# Patient Record
Sex: FEMALE | Race: WHITE | NOT HISPANIC OR LATINO | Employment: FULL TIME | URBAN - METROPOLITAN AREA
[De-identification: names, ages, dates, MRNs, and addresses within clinical notes are randomized per-mention and may not be internally consistent; named-entity substitution may affect disease eponyms.]

---

## 2023-04-05 DIAGNOSIS — I10 ESSENTIAL (PRIMARY) HYPERTENSION: Primary | ICD-10-CM

## 2023-04-07 RX ORDER — B-COMPLEX WITH VITAMIN C
1 TABLET ORAL DAILY
COMMUNITY
End: 2023-05-25

## 2023-04-07 RX ORDER — AMLODIPINE BESYLATE 10 MG/1
10 TABLET ORAL DAILY
COMMUNITY
End: 2023-10-31

## 2023-04-07 RX ORDER — BUPROPION HYDROCHLORIDE 300 MG/1
1 TABLET ORAL DAILY
COMMUNITY
Start: 2023-03-06 | End: 2023-09-05

## 2023-04-07 RX ORDER — BENAZEPRIL HYDROCHLORIDE 40 MG/1
40 TABLET ORAL DAILY
COMMUNITY
End: 2023-09-08

## 2023-04-07 RX ORDER — CHOLECALCIFEROL (VITAMIN D3) 50 MCG
1 TABLET ORAL DAILY
COMMUNITY
Start: 2018-11-01

## 2023-04-07 RX ORDER — ATORVASTATIN CALCIUM 10 MG/1
10 TABLET, FILM COATED ORAL DAILY
COMMUNITY
End: 2023-09-25

## 2023-04-07 RX ORDER — IBUPROFEN 800 MG/1
800 TABLET ORAL 3 TIMES DAILY PRN
COMMUNITY
End: 2023-04-20 | Stop reason: HOSPADM

## 2023-04-07 RX ORDER — ASPIRIN 81 MG/1
81 TABLET ORAL DAILY
COMMUNITY

## 2023-04-07 RX ORDER — METOPROLOL SUCCINATE 200 MG/1
200 TABLET, EXTENDED RELEASE ORAL DAILY
COMMUNITY
End: 2023-04-20 | Stop reason: HOSPADM

## 2023-04-10 RX ORDER — METOPROLOL SUCCINATE 200 MG/1
TABLET, EXTENDED RELEASE ORAL
Qty: 90 TABLET | Refills: 1 | Status: SHIPPED | OUTPATIENT
Start: 2023-04-10 | End: 2023-10-31

## 2023-04-26 PROBLEM — B00.1 COLD SORE: Status: ACTIVE | Noted: 2023-04-26

## 2023-04-26 PROBLEM — E04.2 MULTIPLE THYROID NODULES: Status: ACTIVE | Noted: 2023-04-26

## 2023-04-26 PROBLEM — L98.9 SKIN LESION: Status: ACTIVE | Noted: 2023-04-26

## 2023-04-26 PROBLEM — H80.90 OTOSCLEROSIS: Status: ACTIVE | Noted: 2023-04-26

## 2023-04-26 PROBLEM — L40.9 PSORIASIS: Status: ACTIVE | Noted: 2023-04-26

## 2023-04-26 PROBLEM — B07.9 CUTANEOUS WART: Status: ACTIVE | Noted: 2023-04-26
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
## 2023-04-26 PROBLEM — E78.5 HYPERLIPIDEMIA: Status: ACTIVE | Noted: 2023-04-26

## 2023-04-26 PROBLEM — R53.83 FATIGUE: Status: ACTIVE | Noted: 2023-04-26

## 2023-04-26 PROBLEM — I65.22 STENOSIS OF LEFT CAROTID ARTERY: Status: ACTIVE | Noted: 2023-04-26

## 2023-04-26 PROBLEM — E55.9 VITAMIN D DEFICIENCY: Status: ACTIVE | Noted: 2023-04-26

## 2023-04-26 PROBLEM — B02.9 HERPES ZOSTER: Status: ACTIVE | Noted: 2023-04-26

## 2023-04-26 PROBLEM — D75.89 MACROCYTOSIS WITHOUT ANEMIA: Status: ACTIVE | Noted: 2023-04-26

## 2023-04-26 PROBLEM — I10 HYPERTENSION, BENIGN: Status: ACTIVE | Noted: 2023-04-26

## 2023-04-26 PROBLEM — H91.91 HEARING LOSS OF RIGHT EAR: Status: ACTIVE | Noted: 2023-04-26

## 2023-04-26 PROBLEM — I83.90 VARICOSE VEIN OF LEG: Status: ACTIVE | Noted: 2023-04-26

## 2023-04-26 PROBLEM — F17.200 SMOKING: Status: ACTIVE | Noted: 2023-04-26

## 2023-04-26 PROBLEM — E87.5 HYPERKALEMIA: Status: ACTIVE | Noted: 2023-04-26

## 2023-04-26 PROBLEM — S00.12XA PERIORBITAL ECCHYMOSIS OF LEFT EYE: Status: ACTIVE | Noted: 2023-04-26

## 2023-05-04 LAB
ALANINE AMINOTRANSFERASE (SGPT) (U/L) IN SER/PLAS: 25 U/L (ref 7–45)
ALBUMIN (G/DL) IN SER/PLAS: 4.3 G/DL (ref 3.4–5)
ALKALINE PHOSPHATASE (U/L) IN SER/PLAS: 49 U/L (ref 33–136)
ANION GAP IN SER/PLAS: 9 MMOL/L (ref 10–20)
ASPARTATE AMINOTRANSFERASE (SGOT) (U/L) IN SER/PLAS: 23 U/L (ref 9–39)
BILIRUBIN TOTAL (MG/DL) IN SER/PLAS: 0.6 MG/DL (ref 0–1.2)
CALCIDIOL (25 OH VITAMIN D3) (NG/ML) IN SER/PLAS: 53 NG/ML
CALCIUM (MG/DL) IN SER/PLAS: 9.5 MG/DL (ref 8.6–10.3)
CARBON DIOXIDE, TOTAL (MMOL/L) IN SER/PLAS: 28 MMOL/L (ref 21–32)
CHLORIDE (MMOL/L) IN SER/PLAS: 102 MMOL/L (ref 98–107)
CHOLESTEROL (MG/DL) IN SER/PLAS: 186 MG/DL (ref 0–199)
CHOLESTEROL IN HDL (MG/DL) IN SER/PLAS: 82.6 MG/DL
CHOLESTEROL/HDL RATIO: 2.3
CREATININE (MG/DL) IN SER/PLAS: 0.79 MG/DL (ref 0.5–1.05)
GFR FEMALE: 85 ML/MIN/1.73M2
GLUCOSE (MG/DL) IN SER/PLAS: 98 MG/DL (ref 74–99)
LDL: 90 MG/DL (ref 0–99)
POTASSIUM (MMOL/L) IN SER/PLAS: 4.2 MMOL/L (ref 3.5–5.3)
PROTEIN TOTAL: 7.1 G/DL (ref 6.4–8.2)
SODIUM (MMOL/L) IN SER/PLAS: 135 MMOL/L (ref 136–145)
TRIGLYCERIDE (MG/DL) IN SER/PLAS: 68 MG/DL (ref 0–149)
UREA NITROGEN (MG/DL) IN SER/PLAS: 17 MG/DL (ref 6–23)
VLDL: 14 MG/DL (ref 0–40)

## 2023-05-25 DIAGNOSIS — D75.89 OTHER SPECIFIED DISEASES OF BLOOD AND BLOOD-FORMING ORGANS: ICD-10-CM

## 2023-05-25 RX ORDER — B-COMPLEX WITH VITAMIN C
TABLET ORAL
Qty: 100 TABLET | Refills: 2 | Status: SHIPPED | OUTPATIENT
Start: 2023-05-25 | End: 2024-03-25

## 2023-05-26 ENCOUNTER — APPOINTMENT (OUTPATIENT)
Dept: PRIMARY CARE | Facility: CLINIC | Age: 63
End: 2023-05-26
Payer: COMMERCIAL

## 2023-08-08 ENCOUNTER — TELEPHONE (OUTPATIENT)
Dept: PRIMARY CARE | Facility: CLINIC | Age: 63
End: 2023-08-08
Payer: COMMERCIAL

## 2023-08-08 DIAGNOSIS — L40.9 PSORIASIS: Primary | ICD-10-CM
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
## 2023-08-08 PROBLEM — B07.9 CUTANEOUS WART: Status: RESOLVED | Noted: 2023-04-26 | Resolved: 2023-08-08

## 2023-08-08 PROBLEM — E87.5 HYPERKALEMIA: Status: RESOLVED | Noted: 2023-04-26 | Resolved: 2023-08-08

## 2023-08-08 PROBLEM — L98.9 SKIN LESION: Status: RESOLVED | Noted: 2023-04-26 | Resolved: 2023-08-08

## 2023-08-08 PROBLEM — R53.83 FATIGUE: Status: RESOLVED | Noted: 2023-04-26 | Resolved: 2023-08-08

## 2023-08-08 PROBLEM — S00.12XA PERIORBITAL ECCHYMOSIS OF LEFT EYE: Status: RESOLVED | Noted: 2023-04-26 | Resolved: 2023-08-08

## 2023-08-08 PROBLEM — B00.1 COLD SORE: Status: RESOLVED | Noted: 2023-04-26 | Resolved: 2023-08-08

## 2023-08-08 RX ORDER — MOMETASONE FUROATE 1 MG/G
OINTMENT TOPICAL 2 TIMES DAILY PRN
Qty: 45 G | Refills: 1 | Status: SHIPPED | OUTPATIENT
Start: 2023-08-08 | End: 2023-10-07

## 2023-08-08 NOTE — TELEPHONE ENCOUNTER
Pt wanted to know if there was a cream for psoriasis that you would recommend and could possibly call in today. She is going out of town tomorrow. She said she has been using a salve but its not working as well. She said there was a cream that dr box used to prescribe but its discontinued.     [facility]

## 2023-08-28 ENCOUNTER — OFFICE VISIT (OUTPATIENT)
Dept: PRIMARY CARE | Facility: CLINIC | Age: 63
End: 2023-08-28
Payer: COMMERCIAL

## 2023-08-28 VITALS
DIASTOLIC BLOOD PRESSURE: 82 MMHG | BODY MASS INDEX: 19.83 KG/M2 | RESPIRATION RATE: 20 BRPM | TEMPERATURE: 98.6 F | SYSTOLIC BLOOD PRESSURE: 128 MMHG | WEIGHT: 119 LBS | HEIGHT: 65 IN | HEART RATE: 64 BPM

## 2023-08-28 DIAGNOSIS — Z12.4 SCREENING FOR CERVICAL CANCER: ICD-10-CM

## 2023-08-28 DIAGNOSIS — Z00.00 WELL ADULT HEALTH CHECK: Primary | ICD-10-CM

## 2023-08-28 DIAGNOSIS — H80.93 OTOSCLEROSIS OF BOTH EARS: ICD-10-CM

## 2023-08-28 DIAGNOSIS — M75.81 ROTATOR CUFF TENDONITIS, RIGHT: ICD-10-CM

## 2023-08-28 DIAGNOSIS — Z12.31 ENCOUNTER FOR SCREENING MAMMOGRAM FOR MALIGNANT NEOPLASM OF BREAST: ICD-10-CM

## 2023-08-28 DIAGNOSIS — F17.200 SMOKING: ICD-10-CM

## 2023-08-28 DIAGNOSIS — Z12.11 ENCOUNTER FOR SCREENING FOR MALIGNANT NEOPLASM OF COLON: ICD-10-CM

## 2023-08-28 PROCEDURE — 3074F SYST BP LT 130 MM HG: CPT | Performed by: FAMILY MEDICINE

## 2023-08-28 PROCEDURE — 99396 PREV VISIT EST AGE 40-64: CPT | Performed by: FAMILY MEDICINE

## 2023-08-28 PROCEDURE — 88175 CYTOPATH C/V AUTO FLUID REDO: CPT

## 2023-08-28 PROCEDURE — 3079F DIAST BP 80-89 MM HG: CPT | Performed by: FAMILY MEDICINE

## 2023-08-28 PROCEDURE — 87624 HPV HI-RISK TYP POOLED RSLT: CPT

## 2023-08-28 PROCEDURE — 99213 OFFICE O/P EST LOW 20 MIN: CPT | Performed by: FAMILY MEDICINE

## 2023-08-28 PROCEDURE — 4004F PT TOBACCO SCREEN RCVD TLK: CPT | Performed by: FAMILY MEDICINE

## 2023-08-28 ASSESSMENT — ENCOUNTER SYMPTOMS
SHORTNESS OF BREATH: 0
DIZZINESS: 0
VOMITING: 0
NAUSEA: 0
LIGHT-HEADEDNESS: 0
FEVER: 0

## 2023-08-28 NOTE — PROGRESS NOTES
"Subjective   Patient ID: Ivette Ram is a 63 y.o. female who presents for Annual Exam (Physical and Pap).    Still having trouble with her right shoulder.    Was doing overhead lifting at the gym and it started to bother her.      Well Adult Physical   Patient here for a comprehensive physical exam.The patient reports problems - shoulder pain    Do you take any herbs or supplements that were not prescribed by a doctor? not asked   Are you taking calcium supplements? yes   Are you taking aspirin daily? no                 Review of Systems   Constitutional:  Negative for fever.   Respiratory:  Negative for shortness of breath.    Cardiovascular:  Negative for chest pain.   Gastrointestinal:  Negative for nausea and vomiting.   Neurological:  Negative for dizziness and light-headedness.   All other systems reviewed and are negative.      Objective   /82   Pulse 64   Temp 37 °C (98.6 °F)   Resp 20   Ht 1.651 m (5' 5\")   Wt 54 kg (119 lb)   BMI 19.80 kg/m²     Physical Exam  HENT:      Head: Normocephalic and atraumatic.      Mouth/Throat:      Mouth: Mucous membranes are moist.      Pharynx: Oropharynx is clear.   Eyes:      Extraocular Movements: Extraocular movements intact.      Pupils: Pupils are equal, round, and reactive to light.   Cardiovascular:      Rate and Rhythm: Normal rate and regular rhythm.      Heart sounds: Normal heart sounds.   Pulmonary:      Effort: Pulmonary effort is normal.      Breath sounds: Normal breath sounds.   Genitourinary:     General: Normal vulva.      Urethra: No urethral lesion.      Vagina: Normal. Erythema: mild atrophic changes..      Cervix: Normal.      Uterus: Normal.       Adnexa: Right adnexa normal.   Musculoskeletal:         General: Normal range of motion.      Cervical back: Normal range of motion.   Lymphadenopathy:      Cervical: No cervical adenopathy.   Skin:     General: Skin is warm and dry.   Neurological:      General: No focal deficit present. "      Mental Status: She is alert.   Psychiatric:         Mood and Affect: Mood normal.         Assessment/Plan   Diagnoses and all orders for this visit:  Well adult health check  Smoking  Encounter for screening for malignant neoplasm of colon  -     Colonoscopy Screening; Future  Encounter for screening mammogram for malignant neoplasm of breast  -     BI mammo bilateral screening tomosynthesis; Future  Screening for cervical cancer  -     THINPREP PAP TEST  Otosclerosis of both ears  Rotator cuff tendonitis, right         Patient reported health Good    Regular Dental Visits yes    Regular Eye Visits yes    Hearing Loss yes, would like to discuss hearing aids    Balanced Diet yes    Weight Concerns no    Exercise Regular

## 2023-09-01 DIAGNOSIS — F17.200 NICOTINE DEPENDENCE, UNSPECIFIED, UNCOMPLICATED: ICD-10-CM

## 2023-09-01 LAB
COMPLETE PATHOLOGY REPORT: NORMAL
CONVERTED CLINICAL DIAGNOSIS-HISTORY: NORMAL
CONVERTED DIAGNOSIS COMMENT: NORMAL
CONVERTED FINAL DIAGNOSIS: NORMAL
CONVERTED FINAL REPORT PDF LINK TO COPY AND PASTE: NORMAL

## 2023-09-05 RX ORDER — BUPROPION HYDROCHLORIDE 300 MG/1
300 TABLET ORAL DAILY
Qty: 60 TABLET | Refills: 3 | Status: SHIPPED | OUTPATIENT
Start: 2023-09-05 | End: 2023-10-23 | Stop reason: SDUPTHER

## 2023-09-08 DIAGNOSIS — I10 ESSENTIAL (PRIMARY) HYPERTENSION: ICD-10-CM

## 2023-09-08 RX ORDER — BENAZEPRIL HYDROCHLORIDE 40 MG/1
40 TABLET ORAL DAILY
Qty: 60 TABLET | Refills: 2 | Status: SHIPPED | OUTPATIENT
Start: 2023-09-08 | End: 2024-03-05

## 2023-09-14 DIAGNOSIS — H80.93 OTOSCLEROSIS OF BOTH EARS: Primary | ICD-10-CM

## 2023-09-23 DIAGNOSIS — E78.5 HYPERLIPIDEMIA, UNSPECIFIED: ICD-10-CM

## 2023-09-25 RX ORDER — ATORVASTATIN CALCIUM 10 MG/1
10 TABLET, FILM COATED ORAL DAILY
Qty: 60 TABLET | Refills: 0 | Status: SHIPPED | OUTPATIENT
Start: 2023-09-25 | End: 2023-11-28 | Stop reason: SDUPTHER

## 2023-10-12 ENCOUNTER — APPOINTMENT (OUTPATIENT)
Dept: PRIMARY CARE | Facility: CLINIC | Age: 63
End: 2023-10-12
Payer: COMMERCIAL

## 2023-10-18 ENCOUNTER — TELEPHONE (OUTPATIENT)
Dept: PRIMARY CARE | Facility: CLINIC | Age: 63
End: 2023-10-18
Payer: COMMERCIAL

## 2023-10-18 NOTE — TELEPHONE ENCOUNTER
Pt called and would like to stop taking the bupropion.  She wanted to know if she needs to wean off or if she can just stop taking it.  Is this something she will need an appointment to discuss? Please advise

## 2023-10-19 DIAGNOSIS — F17.200 NICOTINE DEPENDENCE, UNSPECIFIED, UNCOMPLICATED: ICD-10-CM

## 2023-10-20 ENCOUNTER — TELEPHONE (OUTPATIENT)
Dept: PRIMARY CARE | Facility: CLINIC | Age: 63
End: 2023-10-20
Payer: COMMERCIAL

## 2023-10-20 NOTE — TELEPHONE ENCOUNTER
I left vm of Dr. Quinonez's detailed message   And also send her detailed msg to patient thru [facility]

## 2023-10-20 NOTE — TELEPHONE ENCOUNTER
Pt aware of previous message regarding Bupropion. Pt does want to cut back. Request rx for Bupropion 150mg be sent to pharm. Local pharm correct in computer.

## 2023-10-23 RX ORDER — BUPROPION HYDROCHLORIDE 150 MG/1
150 TABLET ORAL DAILY
Qty: 90 TABLET | Refills: 1 | Status: SHIPPED | OUTPATIENT
Start: 2023-10-23 | End: 2024-03-19 | Stop reason: SDUPTHER

## 2023-10-26 ENCOUNTER — CLINICAL SUPPORT (OUTPATIENT)
Dept: AUDIOLOGY | Facility: CLINIC | Age: 63
End: 2023-10-26
Payer: COMMERCIAL

## 2023-10-26 ENCOUNTER — OFFICE VISIT (OUTPATIENT)
Dept: OTOLARYNGOLOGY | Facility: CLINIC | Age: 63
End: 2023-10-26
Payer: COMMERCIAL

## 2023-10-26 DIAGNOSIS — H80.93 OTOSCLEROSIS OF BOTH EARS: ICD-10-CM

## 2023-10-26 DIAGNOSIS — H90.6 MIXED HEARING LOSS, BILATERAL: Primary | ICD-10-CM

## 2023-10-26 PROCEDURE — 99203 OFFICE O/P NEW LOW 30 MIN: CPT | Performed by: PHYSICIAN ASSISTANT

## 2023-10-26 PROCEDURE — 92550 TYMPANOMETRY & REFLEX THRESH: CPT | Performed by: AUDIOLOGIST

## 2023-10-26 PROCEDURE — 92557 COMPREHENSIVE HEARING TEST: CPT | Performed by: AUDIOLOGIST

## 2023-10-26 PROCEDURE — 4004F PT TOBACCO SCREEN RCVD TLK: CPT | Performed by: PHYSICIAN ASSISTANT

## 2023-10-26 NOTE — PROGRESS NOTES
Mrs. Ram, age 63, was seen today for a hearing evaluation.  She has a history of otosclerosis with stapedectomy surgeries bilaterally by Dr. Cowan years ago.  She reported that the surgery was successful in her left ear but was not in her right ear resulting in hearing loss, right greater than left for quite some time.    Results:  Otoscopy revealed clear ear canals and tympanic membranes were visualized bilaterally.  Tympanometry revealed Type A sub d tympanograms, indicating normal ear canal volume, peak pressure with increased compliance/mobility bilaterally.  Ipsilateral acoustic reflexes were present 500-4000 Hz in her left ear and present 500-2000 Hz in her right ear.    Audiometric thresholds revealed a moderate mixed hearing loss in her left ear and a severe mixed hearing loss in her right ear.  Word recognition scores were excellent bilaterally.    Recommendations:  Follow-up with PCP, Dr. Quinonez, as medically directed.  Follow-up with ENT, as medically directed.  Recommend binaural amplification.  Retest hearing annually to monitor.

## 2023-10-26 NOTE — PROGRESS NOTES
Ivette Ram is a 63 y.o. year old female patient with Hearing Loss and Otosclerosis     The patient is a 63-year-old female who presents to the office today with a known history of otosclerosis involving bilateral ears.  She had previous surgery for bilateral stapedectomy years ago.  The patient states that the right stapedectomy was unsuccessful.  She states that she did have a good result with the left ear.  She states that it has been several years since her last ENT follow-up and would like to discuss her options as far as hearing aids are concerned.  She denies any sudden changes.  She is otherwise well without other ENT related concerns.  The patient's intake form incorporating patient medical history, social history,  family history, and review of systems was reviewed by me today in the office and signed on this date and entered into the EMR system.      Review of Systems   All other systems reviewed and are negative.        Physical Exam:   General appearance: No acute distress. Normal facies. Symmetric facial movement. No gross lesions of the face are noted.  The external ear structures appear normal. The ear canals patent and the tympanic membranes are intact without evidence of air-fluid levels, retraction, or congenital defects.  Anterior rhinoscopy notes essentially a midline nasal septum. Examination is noted for normal healthy mucosal membranes without any evidence of lesions, polyps, or exudate. The tongue is normally mobile. There are no lesions on the gingiva, buccal, or oral mucosa. There are no oral cavity masses.  The neck is negative for mass lymphadenopathy. The trachea and parotid are clear. The thyroid bed is grossly unremarkable. The salivary gland structures are grossly unremarkable.    Audiogram:  The patient's audiogram demonstrates bilateral mixed hearing loss.  The patient has a moderate mixed loss involving the left ear and severe mixed loss in the right ear.  The patient does  have excellent word discrimination in the right ear at 100% and 96% in the left ear.  The patient has Type A tympanograms bilaterally.    Assessment/Plan   1.  Bilateral mixed hearing loss  2.  Otosclerosis  Patient seen in the office today for assessment of ears and hearing with a known history of otosclerosis with previous stapedectomy's.  The patient is not interested in any further surgical intervention at this time.  The patient would like to consider hearing aids.  Audiograms were completed today and note bilateral mixed hearing loss with severe mixed loss right and moderate mixed loss left.  Patient is clear for hearing aids.  I would recommend that she follow-up with us in 1 year otherwise she should certainly contact Dr. Devi for evaluation for hearing aids.

## 2023-10-31 DIAGNOSIS — I10 ESSENTIAL (PRIMARY) HYPERTENSION: ICD-10-CM

## 2023-10-31 RX ORDER — METOPROLOL SUCCINATE 200 MG/1
TABLET, EXTENDED RELEASE ORAL
Qty: 60 TABLET | Refills: 2 | Status: SHIPPED | OUTPATIENT
Start: 2023-10-31 | End: 2024-03-19 | Stop reason: SDUPTHER

## 2023-10-31 RX ORDER — AMLODIPINE BESYLATE 10 MG/1
10 TABLET ORAL DAILY
Qty: 60 TABLET | Refills: 3 | Status: SHIPPED | OUTPATIENT
Start: 2023-10-31 | End: 2024-03-19 | Stop reason: SDUPTHER

## 2023-11-17 DIAGNOSIS — E78.5 HYPERLIPIDEMIA, UNSPECIFIED: ICD-10-CM

## 2023-11-27 DIAGNOSIS — E78.5 HYPERLIPIDEMIA, UNSPECIFIED: ICD-10-CM

## 2023-11-28 RX ORDER — ATORVASTATIN CALCIUM 10 MG/1
10 TABLET, FILM COATED ORAL DAILY
Qty: 90 TABLET | Refills: 3 | Status: SHIPPED | OUTPATIENT
Start: 2023-11-28 | End: 2024-03-19 | Stop reason: SDUPTHER

## 2023-12-18 RX ORDER — ATORVASTATIN CALCIUM 10 MG/1
10 TABLET, FILM COATED ORAL DAILY
Qty: 90 TABLET | Refills: 1 | Status: SHIPPED | OUTPATIENT
Start: 2023-12-18 | End: 2024-03-19 | Stop reason: SDUPTHER

## 2024-01-12 ENCOUNTER — APPOINTMENT (OUTPATIENT)
Dept: PRIMARY CARE | Facility: CLINIC | Age: 64
End: 2024-01-12
Payer: COMMERCIAL

## 2024-01-24 ENCOUNTER — CLINICAL SUPPORT (OUTPATIENT)
Dept: AUDIOLOGY | Facility: CLINIC | Age: 64
End: 2024-01-24
Payer: COMMERCIAL

## 2024-01-24 DIAGNOSIS — H90.6 MIXED HEARING LOSS, BILATERAL: Primary | ICD-10-CM

## 2024-01-24 PROCEDURE — HRANC PR HEARING AID NO CHARGE: Performed by: AUDIOLOGIST

## 2024-01-25 NOTE — PROGRESS NOTES
Mrs. Ram returned for a hearing aid consultation.  She was last seen 10/26/23 where a hearing evaluation revealed a moderate mixed hearing loss in her left ear and a severe mixed hearing loss in her right ear.  She has a history of bilateral otosclerosis and has returned today to discuss her options for amplification.    Procedure:  Hearing evaluation results were reviewed.  Hearing aid styles, benefits of amplification and binaural hearing, realistic expectations, differences across levels of technology, rechargeable hearing aids and direct wireless compatibly options were discussed at this appointment.  By the end of the appointment, she decided to order binaural ReSound Omnia 9 13 BRIANNA hearing aids in bronze.  She will be contacted when her devices arrive to schedule her hearing aid fitting appointment.

## 2024-01-26 ENCOUNTER — APPOINTMENT (OUTPATIENT)
Dept: PRIMARY CARE | Facility: CLINIC | Age: 64
End: 2024-01-26
Payer: COMMERCIAL

## 2024-01-29 ENCOUNTER — APPOINTMENT (OUTPATIENT)
Dept: PRIMARY CARE | Facility: CLINIC | Age: 64
End: 2024-01-29
Payer: COMMERCIAL

## 2024-01-30 ENCOUNTER — APPOINTMENT (OUTPATIENT)
Dept: PRIMARY CARE | Facility: CLINIC | Age: 64
End: 2024-01-30
Payer: COMMERCIAL

## 2024-02-09 ENCOUNTER — CLINICAL SUPPORT (OUTPATIENT)
Dept: AUDIOLOGY | Facility: CLINIC | Age: 64
End: 2024-02-09

## 2024-02-09 DIAGNOSIS — H90.6 MIXED HEARING LOSS, BILATERAL: Primary | ICD-10-CM

## 2024-02-09 DIAGNOSIS — H80.93 OTOSCLEROSIS OF BOTH EARS: ICD-10-CM

## 2024-02-09 PROCEDURE — V5261 HEARING AID, DIGIT, BIN, BTE: HCPCS | Performed by: AUDIOLOGIST

## 2024-02-09 PROCEDURE — V5110 HEARING AID DISPENSING FEE: HCPCS | Performed by: AUDIOLOGIST

## 2024-02-12 PROBLEM — H90.6 MIXED HEARING LOSS, BILATERAL: Status: ACTIVE | Noted: 2024-02-12

## 2024-02-12 NOTE — PROGRESS NOTES
Mrs. Ram was seen with her  for the fitting of her binaural ReSound Omnia 9 13 rechargeable hearing aids in champagne coupled to a right size 2 HP  and left size 2 MP  with small power domes.    RIGHT Serial #[id number]  LEFT Serial #[id number]  Service repair & loss/damage warranty expiration: 2/25/2027    Procedure:  DFS was completed and no feedback was noted.  Hearing aids were set to 70% target gain.  Low and high frequency gain were then additionally reduced.  Manual volume control was activated and instructions on this was reviewed.    Battery size, life expectancy and where they can be purchased was reviewed.  Insertion/removal of hearing aids and batteries was reviewed.  Low battery and tune up alerts was demonstrated.  Some cleaning/maintenance tips were reviewed. Warranty information was reviewed.     Mrs. Ram demonstrated understanding of all topics discussed at today's fitting appointment. Payment was accepted in full.  She plans to call the office to schedule her hearing aid follow up appointment once she knows her work schedule.

## 2024-02-15 ENCOUNTER — APPOINTMENT (OUTPATIENT)
Dept: RADIOLOGY | Facility: CLINIC | Age: 64
End: 2024-02-15
Payer: COMMERCIAL

## 2024-02-20 ENCOUNTER — CLINICAL SUPPORT (OUTPATIENT)
Dept: AUDIOLOGY | Facility: CLINIC | Age: 64
End: 2024-02-20
Payer: COMMERCIAL

## 2024-02-20 DIAGNOSIS — H90.6 MIXED HEARING LOSS, BILATERAL: Primary | ICD-10-CM

## 2024-02-20 PROCEDURE — HRANC PR HEARING AID NO CHARGE: Performed by: AUDIOLOGIST

## 2024-02-20 NOTE — PROGRESS NOTES
Mrs. Ram returned for a check on her binaural ReSound Omnia 9 rechargeable BRIANNA hearing aids.  She reported concern that they do not stay put in her ears.  She also reported concern regarding not being able to hear well on the phone and is interested in having the devices connected to blue tooth.    Procedure:  Right size 2 HP was replaced with size 2 MP.  Medium power domes were replaced with small power domes and retention locks were added.  Prescription settings were updated.  Low and high frequency gain was reduced to improve sound quality.  Hearing aids were successfully paired to the Smart 3D margie.  An overview was completed and a test call was made.  She reported satisfaction and will return as needed.

## 2024-02-22 ENCOUNTER — HOSPITAL ENCOUNTER (OUTPATIENT)
Dept: RADIOLOGY | Facility: CLINIC | Age: 64
Discharge: HOME | End: 2024-02-22
Payer: COMMERCIAL

## 2024-02-22 DIAGNOSIS — Z12.31 ENCOUNTER FOR SCREENING MAMMOGRAM FOR MALIGNANT NEOPLASM OF BREAST: ICD-10-CM

## 2024-02-22 PROCEDURE — 77063 BREAST TOMOSYNTHESIS BI: CPT | Mod: BILATERAL PROCEDURE | Performed by: RADIOLOGY

## 2024-02-22 PROCEDURE — 77067 SCR MAMMO BI INCL CAD: CPT

## 2024-02-22 PROCEDURE — 77067 SCR MAMMO BI INCL CAD: CPT | Mod: BILATERAL PROCEDURE | Performed by: RADIOLOGY

## 2024-03-05 ENCOUNTER — HOSPITAL ENCOUNTER (OUTPATIENT)
Dept: RADIOLOGY | Facility: CLINIC | Age: 64
Discharge: HOME | End: 2024-03-05
Payer: COMMERCIAL

## 2024-03-05 DIAGNOSIS — I10 ESSENTIAL (PRIMARY) HYPERTENSION: ICD-10-CM

## 2024-03-05 DIAGNOSIS — M79.673 PAIN OF FOOT, UNSPECIFIED LATERALITY: Primary | ICD-10-CM

## 2024-03-05 DIAGNOSIS — M79.672 PAIN IN LEFT FOOT: ICD-10-CM

## 2024-03-05 PROCEDURE — 73630 X-RAY EXAM OF FOOT: CPT | Mod: LT

## 2024-03-05 PROCEDURE — 73630 X-RAY EXAM OF FOOT: CPT | Mod: LEFT SIDE | Performed by: STUDENT IN AN ORGANIZED HEALTH CARE EDUCATION/TRAINING PROGRAM

## 2024-03-05 RX ORDER — IBUPROFEN 800 MG/1
800 TABLET ORAL 3 TIMES DAILY PRN
Qty: 60 TABLET | Refills: 0 | Status: SHIPPED | OUTPATIENT
Start: 2024-03-05 | End: 2024-03-25

## 2024-03-05 RX ORDER — BENAZEPRIL HYDROCHLORIDE 40 MG/1
40 TABLET ORAL DAILY
Qty: 90 TABLET | Refills: 1 | Status: SHIPPED | OUTPATIENT
Start: 2024-03-05 | End: 2024-03-19 | Stop reason: SDUPTHER

## 2024-03-05 NOTE — TELEPHONE ENCOUNTER
Pt req refill of Ibuprofen 800mg. Pt hurt her foot and that is the only med that helps her. Local pharm correct in computer.

## 2024-03-19 ENCOUNTER — OFFICE VISIT (OUTPATIENT)
Dept: PRIMARY CARE | Facility: CLINIC | Age: 64
End: 2024-03-19
Payer: COMMERCIAL

## 2024-03-19 VITALS
WEIGHT: 126.13 LBS | BODY MASS INDEX: 21.01 KG/M2 | TEMPERATURE: 97.7 F | HEART RATE: 67 BPM | HEIGHT: 65 IN | DIASTOLIC BLOOD PRESSURE: 75 MMHG | SYSTOLIC BLOOD PRESSURE: 131 MMHG

## 2024-03-19 DIAGNOSIS — F17.200 NICOTINE DEPENDENCE, UNSPECIFIED, UNCOMPLICATED: ICD-10-CM

## 2024-03-19 DIAGNOSIS — E78.5 HYPERLIPIDEMIA, UNSPECIFIED: ICD-10-CM

## 2024-03-19 DIAGNOSIS — M79.672 LEFT FOOT PAIN: ICD-10-CM

## 2024-03-19 DIAGNOSIS — D75.89 MACROCYTOSIS WITHOUT ANEMIA: ICD-10-CM

## 2024-03-19 DIAGNOSIS — I10 ESSENTIAL (PRIMARY) HYPERTENSION: Primary | ICD-10-CM

## 2024-03-19 DIAGNOSIS — Z00.00 WELL ADULT HEALTH CHECK: ICD-10-CM

## 2024-03-19 PROBLEM — H91.91 HEARING LOSS OF RIGHT EAR: Status: RESOLVED | Noted: 2023-04-26 | Resolved: 2024-03-19

## 2024-03-19 PROBLEM — F17.210 CIGARETTE NICOTINE DEPENDENCE WITHOUT COMPLICATION: Status: ACTIVE | Noted: 2023-04-26

## 2024-03-19 PROBLEM — E78.2 MIXED HYPERLIPIDEMIA: Status: ACTIVE | Noted: 2023-04-26

## 2024-03-19 PROBLEM — H90.6 MIXED HEARING LOSS, BILATERAL: Status: RESOLVED | Noted: 2024-02-12 | Resolved: 2024-03-19

## 2024-03-19 PROCEDURE — 3078F DIAST BP <80 MM HG: CPT | Performed by: FAMILY MEDICINE

## 2024-03-19 PROCEDURE — 3075F SYST BP GE 130 - 139MM HG: CPT | Performed by: FAMILY MEDICINE

## 2024-03-19 PROCEDURE — 4004F PT TOBACCO SCREEN RCVD TLK: CPT | Performed by: FAMILY MEDICINE

## 2024-03-19 PROCEDURE — 99214 OFFICE O/P EST MOD 30 MIN: CPT | Performed by: FAMILY MEDICINE

## 2024-03-19 RX ORDER — AMLODIPINE BESYLATE 10 MG/1
10 TABLET ORAL DAILY
Qty: 90 TABLET | Refills: 3 | Status: SHIPPED | OUTPATIENT
Start: 2024-03-19

## 2024-03-19 RX ORDER — ATORVASTATIN CALCIUM 10 MG/1
10 TABLET, FILM COATED ORAL DAILY
Qty: 90 TABLET | Refills: 3 | Status: SHIPPED | OUTPATIENT
Start: 2024-03-19

## 2024-03-19 RX ORDER — BENAZEPRIL HYDROCHLORIDE 40 MG/1
40 TABLET ORAL DAILY
Qty: 90 TABLET | Refills: 3 | Status: SHIPPED | OUTPATIENT
Start: 2024-03-19

## 2024-03-19 RX ORDER — METOPROLOL SUCCINATE 200 MG/1
200 TABLET, EXTENDED RELEASE ORAL DAILY
Qty: 90 TABLET | Refills: 3 | Status: SHIPPED | OUTPATIENT
Start: 2024-03-19

## 2024-03-19 RX ORDER — BUPROPION HYDROCHLORIDE 150 MG/1
150 TABLET ORAL DAILY
Qty: 90 TABLET | Refills: 3 | Status: SHIPPED | OUTPATIENT
Start: 2024-03-19

## 2024-03-19 ASSESSMENT — ENCOUNTER SYMPTOMS
HYPERTENSION: 1
SHORTNESS OF BREATH: 0
VOMITING: 0
FEVER: 0
NAUSEA: 0
DIZZINESS: 0
LIGHT-HEADEDNESS: 0

## 2024-03-19 NOTE — PATIENT INSTRUCTIONS
We talked about the need for a colonoscopy.  She is willing to get it.  Worried because insurance doesn't necessarily cover it. She will check and get  back to me.   
Statement Selected

## 2024-03-19 NOTE — PROGRESS NOTES
"Subjective   Ivette Ram is a 63 y.o. female who presents for Hypertension (Follow up).    Doing well.  BP is stable.  Has been taking meds and exercising.      Went walking and developed acute foot pain.  Went to the [facility].  Xray was negative.      Hasn't gotten a colonoscopy yet.   Her Dad had colon cancer.  Sometimes notices a change in her bowel habits.     Hypertension  This is a recurrent problem. The problem has been gradually improving since onset. The problem is controlled. Pertinent negatives include no chest pain or shortness of breath.        Review of Systems   Constitutional:  Negative for fever.   Respiratory:  Negative for shortness of breath.    Cardiovascular:  Negative for chest pain.   Gastrointestinal:  Negative for nausea and vomiting.   Neurological:  Negative for dizziness and light-headedness.   All other systems reviewed and are negative.      Objective   /75   Pulse 67   Temp 36.5 °C (97.7 °F)   Ht 1.651 m (5' 5\")   Wt 57.2 kg (126 lb 2 oz)   BMI 20.99 kg/m²     Physical Exam  HENT:      Head: Normocephalic and atraumatic.      Mouth/Throat:      Mouth: Mucous membranes are moist.      Pharynx: Oropharynx is clear.   Eyes:      Extraocular Movements: Extraocular movements intact.      Pupils: Pupils are equal, round, and reactive to light.   Cardiovascular:      Rate and Rhythm: Normal rate and regular rhythm.      Heart sounds: Normal heart sounds.   Pulmonary:      Effort: Pulmonary effort is normal.      Breath sounds: Normal breath sounds.   Musculoskeletal:         General: Normal range of motion.      Cervical back: Normal range of motion.      Comments: Mild swelling and tenderness dorsum of foot.    Lymphadenopathy:      Cervical: No cervical adenopathy.   Skin:     General: Skin is warm and dry.   Neurological:      General: No focal deficit present.      Mental Status: She is alert.   Psychiatric:         Mood and Affect: Mood normal.         Assessment/Plan "   Problem List Items Addressed This Visit       Essential (primary) hypertension - Primary    Relevant Medications    amLODIPine (Norvasc) 10 mg tablet    benazepril (Lotensin) 40 mg tablet    metoprolol succinate XL (Toprol-XL) 200 mg 24 hr tablet    Other Relevant Orders    Follow Up In [facility] - PCP - Health Maintenance    Macrocytosis without anemia    Relevant Orders    CBC    Cigarette nicotine dependence without complication    Relevant Medications    buPROPion XL (Wellbutrin XL) 150 mg 24 hr tablet    Well adult health check    Relevant Orders    Comprehensive Metabolic Panel    Lipid Panel    Left foot pain     Other Visit Diagnoses       Hyperlipidemia, unspecified        Relevant Medications    atorvastatin (Lipitor) 10 mg tablet              Patient Instructions   We talked about the need for a colonoscopy.  She is willing to get it.  Worried because insurance doesn't necessarily cover it. She will check and get  back to me.

## 2024-03-22 DIAGNOSIS — M79.673 PAIN OF FOOT, UNSPECIFIED LATERALITY: ICD-10-CM

## 2024-03-24 DIAGNOSIS — D75.89 OTHER SPECIFIED DISEASES OF BLOOD AND BLOOD-FORMING ORGANS: ICD-10-CM

## 2024-03-25 RX ORDER — B-COMPLEX WITH VITAMIN C
TABLET ORAL
Qty: 100 TABLET | Refills: 2 | Status: SHIPPED | OUTPATIENT
Start: 2024-03-25

## 2024-03-25 RX ORDER — IBUPROFEN 800 MG/1
800 TABLET ORAL 3 TIMES DAILY PRN
Qty: 60 TABLET | Refills: 0 | Status: SHIPPED | OUTPATIENT
Start: 2024-03-25 | End: 2024-05-20

## 2024-03-25 NOTE — TELEPHONE ENCOUNTER
Refill request from pharmacy//yv    I didn't add refills because I wasn't sure if you wanted to keep filling   Regularly.   Also looks like you just filled this on 3/5/2024 Qty 60

## 2024-03-26 ENCOUNTER — CLINICAL SUPPORT (OUTPATIENT)
Dept: AUDIOLOGY | Facility: CLINIC | Age: 64
End: 2024-03-26
Payer: COMMERCIAL

## 2024-03-26 DIAGNOSIS — H90.6 MIXED HEARING LOSS, BILATERAL: Primary | ICD-10-CM

## 2024-03-26 PROCEDURE — HRANC PR HEARING AID NO CHARGE: Performed by: AUDIOLOGIST

## 2024-03-26 NOTE — PROGRESS NOTES
Mrs. Ram returned today for a check on her binaural ReSound Omnia 9 rechargeable BRIANNA hearing aids.  She reported concern that the retention wire on her left device fell off.  She also reported concern that just recently it doesn't seem to be functioning as well.    Procedure:  Retention lock was replaced.  Hearing aids were cleaned and cerustop wax traps were replaced.  She reported improved sound quality.  Clarifications about her margie were answered.  She reported satisfaction and will return as needed.

## 2024-04-02 DIAGNOSIS — M79.673 PAIN OF FOOT, UNSPECIFIED LATERALITY: ICD-10-CM

## 2024-04-02 NOTE — TELEPHONE ENCOUNTER
Refill request from pharmacy//yv      You just filled this med 3/25 for qty 60  I put in the refuse option

## 2024-04-04 ENCOUNTER — TELEPHONE (OUTPATIENT)
Dept: PRIMARY CARE | Facility: CLINIC | Age: 64
End: 2024-04-04
Payer: COMMERCIAL

## 2024-04-04 RX ORDER — IBUPROFEN 800 MG/1
800 TABLET ORAL 3 TIMES DAILY PRN
Qty: 60 TABLET | Refills: 0 | OUTPATIENT
Start: 2024-04-04 | End: 2025-04-04

## 2024-04-04 NOTE — TELEPHONE ENCOUNTER
1) Pt checked with her insurance and the order for her colonoscopy needs to be coded diagnostic.  2) Pt wanted Dr. Quinonez to know it was an error that the pharm requested refill of Ibuprofen. She thought they were requesting the med to stop smoking. Pt does not need any refills at this time.

## 2024-04-05 DIAGNOSIS — Z12.11 SCREENING FOR MALIGNANT NEOPLASM OF COLON: ICD-10-CM

## 2024-04-05 DIAGNOSIS — D75.89 MACROCYTOSIS WITHOUT ANEMIA: Primary | ICD-10-CM

## 2024-04-05 DIAGNOSIS — Z80.0 FAMILY HX OF COLON CANCER: ICD-10-CM

## 2024-05-16 DIAGNOSIS — M79.673 PAIN OF FOOT, UNSPECIFIED LATERALITY: ICD-10-CM

## 2024-05-20 RX ORDER — IBUPROFEN 800 MG/1
800 TABLET ORAL 3 TIMES DAILY PRN
Qty: 60 TABLET | Refills: 0 | Status: SHIPPED | OUTPATIENT
Start: 2024-05-20 | End: 2025-05-20

## 2024-05-24 ENCOUNTER — LAB (OUTPATIENT)
Dept: LAB | Facility: LAB | Age: 64
End: 2024-05-24
Payer: COMMERCIAL

## 2024-05-24 DIAGNOSIS — Z00.00 WELL ADULT HEALTH CHECK: ICD-10-CM

## 2024-05-24 DIAGNOSIS — D75.89 MACROCYTOSIS WITHOUT ANEMIA: ICD-10-CM

## 2024-05-24 LAB
ALBUMIN SERPL BCP-MCNC: 4.5 G/DL (ref 3.4–5)
ALP SERPL-CCNC: 54 U/L (ref 33–136)
ALT SERPL W P-5'-P-CCNC: 16 U/L (ref 7–45)
ANION GAP SERPL CALC-SCNC: 12 MMOL/L (ref 10–20)
AST SERPL W P-5'-P-CCNC: 20 U/L (ref 9–39)
BILIRUB SERPL-MCNC: 0.6 MG/DL (ref 0–1.2)
BUN SERPL-MCNC: 11 MG/DL (ref 6–23)
CALCIUM SERPL-MCNC: 10 MG/DL (ref 8.6–10.3)
CHLORIDE SERPL-SCNC: 102 MMOL/L (ref 98–107)
CHOLEST SERPL-MCNC: 197 MG/DL (ref 0–199)
CHOLESTEROL/HDL RATIO: 2.3
CO2 SERPL-SCNC: 27 MMOL/L (ref 21–32)
CREAT SERPL-MCNC: 0.7 MG/DL (ref 0.5–1.05)
EGFRCR SERPLBLD CKD-EPI 2021: >90 ML/MIN/1.73M*2
ERYTHROCYTE [DISTWIDTH] IN BLOOD BY AUTOMATED COUNT: 13.4 % (ref 11.5–14.5)
GLUCOSE SERPL-MCNC: 93 MG/DL (ref 74–99)
HCT VFR BLD AUTO: 40.7 % (ref 36–46)
HDLC SERPL-MCNC: 86.8 MG/DL
HGB BLD-MCNC: 13.7 G/DL (ref 12–16)
LDLC SERPL CALC-MCNC: 96 MG/DL
MCH RBC QN AUTO: 34.5 PG (ref 26–34)
MCHC RBC AUTO-ENTMCNC: 33.7 G/DL (ref 32–36)
MCV RBC AUTO: 103 FL (ref 80–100)
NON HDL CHOLESTEROL: 110 MG/DL (ref 0–149)
NRBC BLD-RTO: 0 /100 WBCS (ref 0–0)
PLATELET # BLD AUTO: 384 X10*3/UL (ref 150–450)
POTASSIUM SERPL-SCNC: 5.1 MMOL/L (ref 3.5–5.3)
PROT SERPL-MCNC: 7.3 G/DL (ref 6.4–8.2)
RBC # BLD AUTO: 3.97 X10*6/UL (ref 4–5.2)
SODIUM SERPL-SCNC: 136 MMOL/L (ref 136–145)
TRIGL SERPL-MCNC: 72 MG/DL (ref 0–149)
VLDL: 14 MG/DL (ref 0–40)
WBC # BLD AUTO: 6.9 X10*3/UL (ref 4.4–11.3)

## 2024-05-24 PROCEDURE — 80061 LIPID PANEL: CPT

## 2024-05-24 PROCEDURE — 36415 COLL VENOUS BLD VENIPUNCTURE: CPT

## 2024-05-24 PROCEDURE — 80053 COMPREHEN METABOLIC PANEL: CPT

## 2024-05-24 PROCEDURE — 85027 COMPLETE CBC AUTOMATED: CPT

## 2024-08-30 ENCOUNTER — CLINICAL SUPPORT (OUTPATIENT)
Dept: AUDIOLOGY | Facility: CLINIC | Age: 64
End: 2024-08-30
Payer: COMMERCIAL

## 2024-08-30 DIAGNOSIS — H90.6 MIXED HEARING LOSS, BILATERAL: Primary | ICD-10-CM

## 2024-08-30 NOTE — PROGRESS NOTES
Mrs. Ram returned today for a check on her binaural ReSound Omnia 9 13 devices with concern that her right device has not been functioning.    Procedure:  Examination proved cerustop wax traps to be plugged and hearing aids returned back to good, working status following wax trap replacement.  Additionally, sports locks were replaced upon request.  Wax trap replacement was reviewed with the patient and she was provided more of these for replacement as needed.  She reported satisfaction and will return as needed.

## 2024-09-03 ENCOUNTER — APPOINTMENT (OUTPATIENT)
Dept: PRIMARY CARE | Facility: CLINIC | Age: 64
End: 2024-09-03
Payer: COMMERCIAL

## 2024-09-03 VITALS
TEMPERATURE: 98 F | DIASTOLIC BLOOD PRESSURE: 82 MMHG | HEIGHT: 65 IN | BODY MASS INDEX: 21.35 KG/M2 | HEART RATE: 65 BPM | SYSTOLIC BLOOD PRESSURE: 128 MMHG | WEIGHT: 128.13 LBS

## 2024-09-03 DIAGNOSIS — R19.4 ALTERED BOWEL HABITS: Primary | ICD-10-CM

## 2024-09-03 DIAGNOSIS — I10 ESSENTIAL (PRIMARY) HYPERTENSION: ICD-10-CM

## 2024-09-03 PROCEDURE — 99213 OFFICE O/P EST LOW 20 MIN: CPT | Performed by: FAMILY MEDICINE

## 2024-09-03 PROCEDURE — 3079F DIAST BP 80-89 MM HG: CPT | Performed by: FAMILY MEDICINE

## 2024-09-03 PROCEDURE — 3074F SYST BP LT 130 MM HG: CPT | Performed by: FAMILY MEDICINE

## 2024-09-03 PROCEDURE — 3008F BODY MASS INDEX DOCD: CPT | Performed by: FAMILY MEDICINE

## 2024-09-03 RX ORDER — MOXIFLOXACIN 5 MG/ML
SOLUTION/ DROPS OPHTHALMIC
COMMUNITY
Start: 2024-08-30

## 2024-09-03 RX ORDER — PREDNISOLONE ACETATE 10 MG/ML
SUSPENSION/ DROPS OPHTHALMIC
COMMUNITY
Start: 2024-08-30

## 2024-09-03 NOTE — ASSESSMENT & PLAN NOTE
Ivette is way overdue for colonoscopy.  She is complaining of a change in her bowel habits and is willing to get it done in the next few months.  Orders:    Colonoscopy Diagnostic; Future

## 2024-09-03 NOTE — PROGRESS NOTES
"Subjective   Ivette Ram is a 64 y.o. female who presents for Follow-up.    Here for a recheck.  Planning to get her cataracts done tomorrow.  Still trying to quit smoking.  Taking wellbutrin.  Down to 5 cigs per day.  .      BP is much lower at home.  120's over 80's    Has noticed a change in bowel habits.  Needs colonoscopy         Review of Systems    Objective   /82   Pulse 65   Temp 36.7 °C (98 °F)   Ht 1.651 m (5' 5\")   Wt 58.1 kg (128 lb 2 oz)   BMI 21.32 kg/m²     Physical Exam  HENT:      Head: Normocephalic and atraumatic.      Mouth/Throat:      Mouth: Mucous membranes are moist.      Pharynx: Oropharynx is clear.   Eyes:      Extraocular Movements: Extraocular movements intact.      Pupils: Pupils are equal, round, and reactive to light.   Cardiovascular:      Rate and Rhythm: Normal rate and regular rhythm.      Heart sounds: Normal heart sounds.   Pulmonary:      Effort: Pulmonary effort is normal.      Breath sounds: Normal breath sounds.   Musculoskeletal:         General: Normal range of motion.      Cervical back: Normal range of motion.   Lymphadenopathy:      Cervical: No cervical adenopathy.   Skin:     General: Skin is warm and dry.   Neurological:      General: No focal deficit present.      Mental Status: She is alert.   Psychiatric:         Mood and Affect: Mood normal.         Assessment/Plan   Assessment & Plan  Essential (primary) hypertension  Monitor your blood pressure at home at least once a week and record.  Please bring results to your next visit.  Take your medicine as prescribed.    Exercise at least 30 minutes daily.  Lose weight.   Avoid eating processed foods, canned foods, etc.    Limit adding salt at the table.      Orders:    Follow Up In [facility] - PCP - Health Maintenance    Altered bowel habits  Ivette is way overdue for colonoscopy.  She is complaining of a change in her bowel habits and is willing to get it done in the next few " months.  Orders:    Colonoscopy Diagnostic; Future           There are no Patient Instructions on file for this visit.

## 2024-09-03 NOTE — ASSESSMENT & PLAN NOTE
Monitor your blood pressure at home at least once a week and record.  Please bring results to your next visit.  Take your medicine as prescribed.    Exercise at least 30 minutes daily.  Lose weight.   Avoid eating processed foods, canned foods, etc.    Limit adding salt at the table.      Orders:    Follow Up In [facility] - PCP - Health Maintenance

## 2024-09-18 ENCOUNTER — TELEPHONE (OUTPATIENT)
Dept: GASTROENTEROLOGY | Facility: EXTERNAL LOCATION | Age: 64
End: 2024-09-18
Payer: COMMERCIAL

## 2024-10-18 ENCOUNTER — APPOINTMENT (OUTPATIENT)
Dept: OTOLARYNGOLOGY | Facility: CLINIC | Age: 64
End: 2024-10-18
Payer: COMMERCIAL

## 2025-01-19 DIAGNOSIS — D75.89 OTHER SPECIFIED DISEASES OF BLOOD AND BLOOD-FORMING ORGANS: ICD-10-CM

## 2025-01-20 RX ORDER — B-COMPLEX WITH VITAMIN C
TABLET ORAL
Qty: 90 TABLET | Refills: 0 | Status: SHIPPED | OUTPATIENT
Start: 2025-01-20

## 2025-01-24 ENCOUNTER — OFFICE VISIT (OUTPATIENT)
Dept: PRIMARY CARE | Facility: CLINIC | Age: 65
End: 2025-01-24
Payer: COMMERCIAL

## 2025-01-24 VITALS
HEIGHT: 65 IN | BODY MASS INDEX: 21.32 KG/M2 | SYSTOLIC BLOOD PRESSURE: 146 MMHG | TEMPERATURE: 97.3 F | HEART RATE: 86 BPM | DIASTOLIC BLOOD PRESSURE: 96 MMHG

## 2025-01-24 DIAGNOSIS — S93.491A SPRAIN OF POSTERIOR TALOFIBULAR LIGAMENT OF RIGHT ANKLE, INITIAL ENCOUNTER: Primary | ICD-10-CM

## 2025-01-24 PROCEDURE — 3077F SYST BP >= 140 MM HG: CPT | Performed by: FAMILY MEDICINE

## 2025-01-24 PROCEDURE — 4004F PT TOBACCO SCREEN RCVD TLK: CPT | Performed by: FAMILY MEDICINE

## 2025-01-24 PROCEDURE — 99213 OFFICE O/P EST LOW 20 MIN: CPT | Performed by: FAMILY MEDICINE

## 2025-01-24 PROCEDURE — 3080F DIAST BP >= 90 MM HG: CPT | Performed by: FAMILY MEDICINE

## 2025-01-24 RX ORDER — IBUPROFEN 800 MG/1
800 TABLET ORAL EVERY 6 HOURS PRN
COMMUNITY
Start: 2024-12-13

## 2025-01-24 NOTE — ASSESSMENT & PLAN NOTE
Advised that it is normal to have this much swelling 1 month out from an ankle sprain.  I reviewed the x-ray.  I do not think she has a venous thrombosis as most of the swelling is localized around the ankle and only extends proximally anteriorly.  I advised her to wear compression stockings at all times while she is at work.  She can also use an ankle brace if her ankle is bothering her if she is going on a walk.  I still recommend she rest the area and at nighttime elevate and do the alphabet with her toe so that she can start working on rehabbing those muscles.  Please call me if you have any changes in your condition.  Keep your follow-up in a month.  Nice to see you today

## 2025-01-24 NOTE — PROGRESS NOTES
"Subjective   Ivette Ram is a 64 y.o. female who presents for Edema (Right ankle - sprained it in December/Still has some swelling going up lower leg to knee/).    Ivette was out of town about a month ago and she was walking on uneven surface and sprained her ankle on the right.  It swelled up immediately she was unable to bear weight.  She went to an urgent care and had an evaluation and an x-ray the x-ray was negative for fracture but did show soft tissue swelling.  She was advised to wear brace and try to stay off of it.  Since that time she has been wearing some over-the-counter braces and went immediately back to work.  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  By the end of his shift she has a lot of edema, she goes home and puts her leg up and the edema improved significantly over the course of the night.  Now that she is out 1 months she is concerned about the edema.  The pain is improving and her weightbearing is improving.  She tried to go for a long walk the other day and had more pain         Review of Systems    Objective   BP (!) 146/96   Pulse 86   Temp 36.3 °C (97.3 °F)   Ht 1.651 m (5' 5\")   BMI 21.32 kg/m²     Physical Exam  Constitutional:       Appearance: Normal appearance.   Musculoskeletal:      Comments: Right ankle and lower half of calf is swollen.  Tender only over the posterior lateral ligament.  Calf is non tender and minimally edematous.     Neurological:      Mental Status: She is alert.         Assessment/Plan   Assessment & Plan  Sprain of posterior talofibular ligament of right ankle, initial encounter  Advised that it is normal to have this much swelling 1 month out from an ankle sprain.  I reviewed the x-ray.  I do not think she has a venous thrombosis as most of the swelling is localized around the ankle and only extends proximally anteriorly.  I advised her to wear compression stockings at all times while she is at work.  She can also use an " ankle brace if her ankle is bothering her if she is going on a walk.  I still recommend she rest the area and at nighttime elevate and do the alphabet with her toe so that she can start working on rehabbing those muscles.  Please call me if you have any changes in your condition.  Keep your follow-up in a month.  Nice to see you today              There are no Patient Instructions on file for this visit.

## 2025-01-27 ENCOUNTER — APPOINTMENT (OUTPATIENT)
Dept: PRIMARY CARE | Facility: CLINIC | Age: 65
End: 2025-01-27
Payer: COMMERCIAL

## 2025-03-03 ENCOUNTER — APPOINTMENT (OUTPATIENT)
Dept: PRIMARY CARE | Facility: CLINIC | Age: 65
End: 2025-03-03
Payer: COMMERCIAL

## 2025-03-03 VITALS
HEART RATE: 87 BPM | HEIGHT: 65 IN | BODY MASS INDEX: 21.32 KG/M2 | TEMPERATURE: 97.9 F | SYSTOLIC BLOOD PRESSURE: 124 MMHG | DIASTOLIC BLOOD PRESSURE: 82 MMHG

## 2025-03-03 DIAGNOSIS — E78.5 HYPERLIPIDEMIA, UNSPECIFIED: ICD-10-CM

## 2025-03-03 DIAGNOSIS — Z00.00 WELL ADULT HEALTH CHECK: Primary | ICD-10-CM

## 2025-03-03 DIAGNOSIS — Z12.11 SCREENING FOR MALIGNANT NEOPLASM OF COLON: ICD-10-CM

## 2025-03-03 DIAGNOSIS — I10 ESSENTIAL (PRIMARY) HYPERTENSION: ICD-10-CM

## 2025-03-03 DIAGNOSIS — F17.200 NICOTINE DEPENDENCE, UNSPECIFIED, UNCOMPLICATED: ICD-10-CM

## 2025-03-03 DIAGNOSIS — S93.491D SPRAIN OF POSTERIOR TALOFIBULAR LIGAMENT OF RIGHT ANKLE, SUBSEQUENT ENCOUNTER: ICD-10-CM

## 2025-03-03 DIAGNOSIS — Z12.31 ENCOUNTER FOR SCREENING MAMMOGRAM FOR MALIGNANT NEOPLASM OF BREAST: ICD-10-CM

## 2025-03-03 PROCEDURE — 3074F SYST BP LT 130 MM HG: CPT | Performed by: FAMILY MEDICINE

## 2025-03-03 PROCEDURE — 4004F PT TOBACCO SCREEN RCVD TLK: CPT | Performed by: FAMILY MEDICINE

## 2025-03-03 PROCEDURE — 99213 OFFICE O/P EST LOW 20 MIN: CPT | Performed by: FAMILY MEDICINE

## 2025-03-03 PROCEDURE — 3079F DIAST BP 80-89 MM HG: CPT | Performed by: FAMILY MEDICINE

## 2025-03-03 PROCEDURE — 99396 PREV VISIT EST AGE 40-64: CPT | Performed by: FAMILY MEDICINE

## 2025-03-03 RX ORDER — AMLODIPINE BESYLATE 10 MG/1
10 TABLET ORAL DAILY
Qty: 90 TABLET | Refills: 3 | Status: SHIPPED | OUTPATIENT
Start: 2025-03-03

## 2025-03-03 RX ORDER — BENAZEPRIL HYDROCHLORIDE 40 MG/1
40 TABLET ORAL DAILY
Qty: 90 TABLET | Refills: 3 | Status: SHIPPED | OUTPATIENT
Start: 2025-03-03

## 2025-03-03 RX ORDER — BUPROPION HYDROCHLORIDE 150 MG/1
150 TABLET ORAL DAILY
Qty: 90 TABLET | Refills: 3 | Status: SHIPPED | OUTPATIENT
Start: 2025-03-03

## 2025-03-03 RX ORDER — ATORVASTATIN CALCIUM 10 MG/1
10 TABLET, FILM COATED ORAL DAILY
Qty: 90 TABLET | Refills: 3 | Status: SHIPPED | OUTPATIENT
Start: 2025-03-03

## 2025-03-03 RX ORDER — METOPROLOL SUCCINATE 200 MG/1
200 TABLET, EXTENDED RELEASE ORAL DAILY
Qty: 90 TABLET | Refills: 3 | Status: SHIPPED | OUTPATIENT
Start: 2025-03-03

## 2025-03-03 RX ORDER — MELOXICAM 15 MG/1
15 TABLET ORAL DAILY
Qty: 30 TABLET | Refills: 0 | Status: SHIPPED | OUTPATIENT
Start: 2025-03-03 | End: 2025-04-02

## 2025-03-03 NOTE — PROGRESS NOTES
"Subjective   Ivette Ram is a 64 y.o. female who presents for Follow-up (6mon follow up).    HPI     Review of Systems    Objective   /82   Pulse 87   Temp 36.6 °C (97.9 °F)   Ht 1.651 m (5' 5\")   BMI 21.32 kg/m²     Physical Exam    Assessment/Plan   Assessment & Plan  Well adult health check   and [facility] UTD  Orders:    Comprehensive Metabolic Panel; Future    Lipid Panel; Future    XR DEXA bone density; Future    Essential (primary) hypertension  Monitor your blood pressure at home at least once a week and record.  Please bring results to your next visit.  Take your medicine as prescribed.    Exercise at least 30 minutes daily.  Lose weight.   Avoid eating processed foods, canned foods, etc.    Limit adding salt at the table.      Orders:    amLODIPine (Norvasc) 10 mg tablet; Take 1 tablet (10 mg) by mouth once daily.    benazepril (Lotensin) 40 mg tablet; Take 1 tablet (40 mg) by mouth once daily.    metoprolol succinate XL (Toprol-XL) 200 mg 24 hr tablet; Take 1 tablet (200 mg) by mouth once daily. Do not crush or chew.    Follow Up In [facility] - PCP - Established; Future    Hyperlipidemia, unspecified    Orders:    atorvastatin (Lipitor) 10 mg tablet; Take 1 tablet (10 mg) by mouth once daily.    Nicotine dependence, unspecified, uncomplicated  Discussed smoking cessation  Orders:    buPROPion XL (Wellbutrin XL) 150 mg 24 hr tablet; Take 1 tablet (150 mg) by mouth once daily.    Screening for malignant neoplasm of colon    Orders:    Colonoscopy Screening; Average Risk Patient; Future    Sprain of posterior talofibular ligament of right ankle, subsequent encounter  Avoid all other NSAIDS while you are on this med.    Please use an ACE bandage when you are on your ankle a lot.  At rest do ROM  Orders:    meloxicam (Mobic) 15 mg tablet; Take 1 tablet (15 mg) by mouth once daily.    Encounter for screening mammogram for malignant neoplasm of breast    Orders:    BI mammo bilateral " screening tomosynthesis; Future           Patient Instructions   Please consider going to the pharmacy to get your shingles shot.

## 2025-03-03 NOTE — ASSESSMENT & PLAN NOTE
HM and imm UTD  Orders:    Comprehensive Metabolic Panel; Future    Lipid Panel; Future    XR DEXA bone density; Future

## 2025-03-03 NOTE — ASSESSMENT & PLAN NOTE
Avoid all other NSAIDS while you are on this med.    Please use an ACE bandage when you are on your ankle a lot.  At rest do ROM  Orders:    meloxicam (Mobic) 15 mg tablet; Take 1 tablet (15 mg) by mouth once daily.

## 2025-03-06 ENCOUNTER — TELEPHONE (OUTPATIENT)
Dept: GASTROENTEROLOGY | Facility: CLINIC | Age: 65
End: 2025-03-06
Payer: COMMERCIAL

## 2025-03-24 ENCOUNTER — APPOINTMENT (OUTPATIENT)
Dept: RADIOLOGY | Facility: CLINIC | Age: 65
End: 2025-03-24
Payer: COMMERCIAL

## 2025-03-31 DIAGNOSIS — S93.491D SPRAIN OF POSTERIOR TALOFIBULAR LIGAMENT OF RIGHT ANKLE, SUBSEQUENT ENCOUNTER: ICD-10-CM

## 2025-03-31 RX ORDER — MELOXICAM 15 MG/1
15 TABLET ORAL DAILY
Qty: 30 TABLET | Refills: 0 | Status: SHIPPED | OUTPATIENT
Start: 2025-03-31

## 2025-04-08 ENCOUNTER — APPOINTMENT (OUTPATIENT)
Dept: RADIOLOGY | Facility: CLINIC | Age: 65
End: 2025-04-08
Payer: COMMERCIAL

## 2025-04-09 ENCOUNTER — HOSPITAL ENCOUNTER (OUTPATIENT)
Dept: RADIOLOGY | Facility: CLINIC | Age: 65
Discharge: HOME | End: 2025-04-09
Payer: COMMERCIAL

## 2025-04-09 VITALS — HEIGHT: 65 IN | BODY MASS INDEX: 21.49 KG/M2 | WEIGHT: 129 LBS

## 2025-04-09 DIAGNOSIS — Z12.31 ENCOUNTER FOR SCREENING MAMMOGRAM FOR MALIGNANT NEOPLASM OF BREAST: ICD-10-CM

## 2025-04-09 PROCEDURE — 77067 SCR MAMMO BI INCL CAD: CPT | Performed by: RADIOLOGY

## 2025-04-09 PROCEDURE — 77063 BREAST TOMOSYNTHESIS BI: CPT

## 2025-04-09 PROCEDURE — 77063 BREAST TOMOSYNTHESIS BI: CPT | Performed by: RADIOLOGY

## 2025-04-14 DIAGNOSIS — R92.8 ABNORMAL MAMMOGRAM: Primary | ICD-10-CM

## 2025-04-16 DIAGNOSIS — D75.89 OTHER SPECIFIED DISEASES OF BLOOD AND BLOOD-FORMING ORGANS: ICD-10-CM

## 2025-04-17 RX ORDER — B-COMPLEX WITH VITAMIN C
1 TABLET ORAL DAILY
Qty: 90 TABLET | Refills: 1 | Status: SHIPPED | OUTPATIENT
Start: 2025-04-17

## 2025-05-14 ENCOUNTER — HOSPITAL ENCOUNTER (OUTPATIENT)
Dept: RADIOLOGY | Facility: HOSPITAL | Age: 65
Discharge: HOME | End: 2025-05-14
Payer: COMMERCIAL

## 2025-05-14 DIAGNOSIS — R92.8 ABNORMAL MAMMOGRAM: ICD-10-CM

## 2025-05-14 PROCEDURE — 77065 DX MAMMO INCL CAD UNI: CPT | Mod: RT

## 2025-05-14 PROCEDURE — 76642 ULTRASOUND BREAST LIMITED: CPT | Mod: RT

## 2025-06-16 ENCOUNTER — APPOINTMENT (OUTPATIENT)
Dept: AUDIOLOGY | Facility: CLINIC | Age: 65
End: 2025-06-16
Payer: COMMERCIAL

## 2025-06-18 ENCOUNTER — CLINICAL SUPPORT (OUTPATIENT)
Dept: AUDIOLOGY | Facility: CLINIC | Age: 65
End: 2025-06-18
Payer: COMMERCIAL

## 2025-06-18 DIAGNOSIS — H80.93 OTOSCLEROSIS OF BOTH EARS: ICD-10-CM

## 2025-06-18 DIAGNOSIS — H90.6 MIXED HEARING LOSS, BILATERAL: Primary | ICD-10-CM

## 2025-06-18 NOTE — PROGRESS NOTES
Mrs. Ram returned today for a check on her binaural [employer] 9 rechargeable BRIANNA hearing aids.  She reported her sports locks have broken and need replaced.  She is also in need of more wax traps.    Procedure:  Hearing aids were checked and cleaned.  Sports locks, small power domes and wax traps were all replaced.  She was also provided replacement wax traps to use at home as needed.  She reported satisfaction and will return as needed.

## 2025-09-08 ENCOUNTER — APPOINTMENT (OUTPATIENT)
Dept: PRIMARY CARE | Facility: CLINIC | Age: 65
End: 2025-09-08
Payer: COMMERCIAL

## 2025-12-16 ENCOUNTER — APPOINTMENT (OUTPATIENT)
Dept: PRIMARY CARE | Facility: CLINIC | Age: 65
End: 2025-12-16
Payer: COMMERCIAL